# Patient Record
Sex: FEMALE | Race: WHITE | Employment: FULL TIME | URBAN - METROPOLITAN AREA
[De-identification: names, ages, dates, MRNs, and addresses within clinical notes are randomized per-mention and may not be internally consistent; named-entity substitution may affect disease eponyms.]

---

## 2017-03-15 RX ORDER — METOPROLOL SUCCINATE 50 MG/1
TABLET, EXTENDED RELEASE ORAL
Qty: 30 TABLET | Refills: 0 | Status: SHIPPED | OUTPATIENT
Start: 2017-03-15 | End: 2017-04-12

## 2017-03-15 NOTE — TELEPHONE ENCOUNTER
Pt has been notified. She states she has not seen Endocrine yet.    Future Appointments  Date Time Provider Julian Espinoza   3/22/2017 4:00 PM [facility] NURSE MAVERICK EM[facility]man to Dr. Anastasiia Kang

## 2017-03-15 NOTE — TELEPHONE ENCOUNTER
Script sent, but I would like newer blood pressure, because it wasn't quite under control. Can she come in for an RN check? Also, did she ever see endocrine?

## 2017-03-22 ENCOUNTER — NURSE ONLY (OUTPATIENT)
Dept: FAMILY MEDICINE CLINIC | Facility: CLINIC | Age: 47
End: 2017-03-22

## 2017-03-22 VITALS — DIASTOLIC BLOOD PRESSURE: 80 MMHG | SYSTOLIC BLOOD PRESSURE: 124 MMHG

## 2017-03-22 DIAGNOSIS — Z02.9 ENCOUNTERS FOR ADMINISTRATIVE PURPOSE: Primary | ICD-10-CM

## 2017-03-22 NOTE — PROGRESS NOTES
Pt came in for a blood pressure check. I checked BP in right arm, and was not able to hear clearly. I had my coworker check BP, she checked in left arm.

## 2017-03-23 NOTE — PROGRESS NOTES
Patient notified and verbalized understanding. States she has appt with Endo 4/18/17.   Says she will need a refill of BP medication prior to appt.,  Advised patient to call our office when she has a few days left and we can refill

## 2017-04-12 RX ORDER — METOPROLOL SUCCINATE 50 MG/1
TABLET, EXTENDED RELEASE ORAL
Qty: 90 TABLET | Refills: 1 | Status: SHIPPED | OUTPATIENT
Start: 2017-04-12 | End: 2017-10-08

## 2017-04-20 ENCOUNTER — MED REC SCAN ONLY (OUTPATIENT)
Dept: FAMILY MEDICINE CLINIC | Facility: CLINIC | Age: 47
End: 2017-04-20

## 2017-05-25 ENCOUNTER — TELEPHONE (OUTPATIENT)
Dept: FAMILY MEDICINE CLINIC | Facility: CLINIC | Age: 47
End: 2017-05-25

## 2017-08-18 ENCOUNTER — OFFICE VISIT (OUTPATIENT)
Dept: FAMILY MEDICINE CLINIC | Facility: CLINIC | Age: 47
End: 2017-08-18

## 2017-08-18 VITALS
BODY MASS INDEX: 23 KG/M2 | SYSTOLIC BLOOD PRESSURE: 104 MMHG | WEIGHT: 118.63 LBS | RESPIRATION RATE: 16 BRPM | TEMPERATURE: 99 F | HEART RATE: 56 BPM | DIASTOLIC BLOOD PRESSURE: 70 MMHG

## 2017-08-18 DIAGNOSIS — B07.9 VIRAL WARTS, UNSPECIFIED TYPE: Primary | ICD-10-CM

## 2017-08-18 PROCEDURE — 99213 OFFICE O/P EST LOW 20 MIN: CPT | Performed by: FAMILY MEDICINE

## 2017-08-18 NOTE — PROGRESS NOTES
Kylee Castro is a 48year old female. CC:  Patient presents with:  Growth: on right hand per pt      HPI:  Growth on R hand for many years. OTC wart meds of no help. The lesion can get bumped and bleed.   Allergies:    Bee Venom               Unk encounter      No Follow-up on file.                 Authorized by Oksana Bowers M.D.

## 2017-10-09 RX ORDER — METOPROLOL SUCCINATE 50 MG/1
TABLET, EXTENDED RELEASE ORAL
Qty: 90 TABLET | Refills: 1 | Status: SHIPPED | OUTPATIENT
Start: 2017-10-09 | End: 2018-04-08

## 2017-10-09 NOTE — TELEPHONE ENCOUNTER
LOV 08/18/2017 with TJ for viral warts. Last refill  04/12/2017  #90 w. 1RF  No future appointments. Please advise.

## 2017-10-09 NOTE — TELEPHONE ENCOUNTER
Patient advised, states she had b/p checked 8/18 = 104/70. Also states she has lost \"a bunch of weight\". Do you still need to see her, states she does not get physicals.

## 2017-10-11 ENCOUNTER — PATIENT OUTREACH (OUTPATIENT)
Dept: FAMILY MEDICINE CLINIC | Facility: CLINIC | Age: 47
End: 2017-10-11

## 2017-10-11 NOTE — PROGRESS NOTES
Raul Lomeli is due for mammogram.   Last mammogram date:  None in Epic. Mychart/Letter sent to patient.

## 2018-01-08 ENCOUNTER — OFFICE VISIT (OUTPATIENT)
Dept: FAMILY MEDICINE CLINIC | Facility: CLINIC | Age: 48
End: 2018-01-08

## 2018-01-08 VITALS
HEART RATE: 58 BPM | TEMPERATURE: 99 F | SYSTOLIC BLOOD PRESSURE: 140 MMHG | WEIGHT: 128.19 LBS | BODY MASS INDEX: 24.52 KG/M2 | DIASTOLIC BLOOD PRESSURE: 82 MMHG | RESPIRATION RATE: 12 BRPM | HEIGHT: 60.5 IN

## 2018-01-08 DIAGNOSIS — I10 ESSENTIAL HYPERTENSION: Primary | ICD-10-CM

## 2018-01-08 PROCEDURE — 99213 OFFICE O/P EST LOW 20 MIN: CPT | Performed by: FAMILY MEDICINE

## 2018-01-08 NOTE — PROGRESS NOTES
Stephen Schneider is a 50year old female. HPI:   Patient presents for recheck of her hypertension. Pt has been taking medications as instructed, no medication side effects, home BP monitoring in the range of 248'R systolic and 90'B diastolic.    When denies headaches    EXAM:   /82   Pulse 58   Temp 99 °F (37.2 °C) (Temporal)   Resp 12   Ht 60.5\"   Wt 128 lb 3.2 oz   BMI 24.63 kg/m²   GENERAL: well developed, well nourished,in no apparent distress  SKIN: no rashes,no suspicious lesions  HEENT: a

## 2018-04-09 RX ORDER — METOPROLOL SUCCINATE 50 MG/1
TABLET, EXTENDED RELEASE ORAL
Qty: 90 TABLET | Refills: 1 | Status: SHIPPED | OUTPATIENT
Start: 2018-04-09 | End: 2018-10-07

## 2018-10-08 RX ORDER — METOPROLOL SUCCINATE 50 MG/1
TABLET, EXTENDED RELEASE ORAL
Qty: 90 TABLET | Refills: 0 | Status: SHIPPED | OUTPATIENT
Start: 2018-10-08 | End: 2019-01-07

## 2018-11-13 ENCOUNTER — PATIENT OUTREACH (OUTPATIENT)
Dept: FAMILY MEDICINE CLINIC | Facility: CLINIC | Age: 48
End: 2018-11-13

## 2019-01-07 RX ORDER — METOPROLOL SUCCINATE 50 MG/1
TABLET, EXTENDED RELEASE ORAL
Qty: 90 TABLET | Refills: 0 | Status: SHIPPED | OUTPATIENT
Start: 2019-01-07 | End: 2019-04-08

## 2019-04-08 RX ORDER — METOPROLOL SUCCINATE 50 MG/1
TABLET, EXTENDED RELEASE ORAL
Qty: 90 TABLET | Refills: 0 | Status: SHIPPED | OUTPATIENT
Start: 2019-04-08 | End: 2019-07-08

## 2019-04-08 NOTE — TELEPHONE ENCOUNTER
Last refilled on 1/7/19 for # 90 with 0 refills  Last OV 1/8/18, BP 1/8/18  No future appointments. Thank you.

## 2019-05-31 ENCOUNTER — OFFICE VISIT (OUTPATIENT)
Dept: FAMILY MEDICINE CLINIC | Facility: CLINIC | Age: 49
End: 2019-05-31
Payer: COMMERCIAL

## 2019-05-31 VITALS
SYSTOLIC BLOOD PRESSURE: 130 MMHG | DIASTOLIC BLOOD PRESSURE: 80 MMHG | WEIGHT: 155.19 LBS | RESPIRATION RATE: 14 BRPM | OXYGEN SATURATION: 99 % | BODY MASS INDEX: 28.92 KG/M2 | HEIGHT: 61.5 IN | TEMPERATURE: 98 F | HEART RATE: 62 BPM

## 2019-05-31 DIAGNOSIS — M54.2 NECK PAIN: ICD-10-CM

## 2019-05-31 DIAGNOSIS — G44.229 CHRONIC TENSION-TYPE HEADACHE, NOT INTRACTABLE: Primary | ICD-10-CM

## 2019-05-31 DIAGNOSIS — M54.12 CERVICAL RADICULOPATHY: ICD-10-CM

## 2019-05-31 PROCEDURE — 99214 OFFICE O/P EST MOD 30 MIN: CPT | Performed by: FAMILY MEDICINE

## 2019-05-31 RX ORDER — CYCLOBENZAPRINE HCL 10 MG
10 TABLET ORAL 3 TIMES DAILY PRN
Qty: 15 TABLET | Refills: 0 | Status: SHIPPED | OUTPATIENT
Start: 2019-05-31 | End: 2019-06-20

## 2019-05-31 RX ORDER — METOPROLOL SUCCINATE 50 MG/1
TABLET, EXTENDED RELEASE ORAL
COMMUNITY
Start: 2018-07-08 | End: 2019-06-25 | Stop reason: ALTCHOICE

## 2019-05-31 NOTE — PROGRESS NOTES
Lida Louise is a 46year old female. Patient presents with:  Headache: per pt  Neck Pain      HPI:   Neck pain and bad headaches over the past month. Once weekly. Headache will last the whole day and be incapacitating.  excedrine migraine helps some exertion  CARDIOVASCULAR: denies chest pain on exertion  GI: denies abdominal pain and denies heartburn  NEURO: denies headaches    EXAM:   /80   Pulse 62   Temp 98.3 °F (36.8 °C) (Temporal)   Resp 14   Ht 61.5\"   Wt 155 lb 3.2 oz   SpO2 99%   BMI 2

## 2019-06-25 ENCOUNTER — OFFICE VISIT (OUTPATIENT)
Dept: FAMILY MEDICINE CLINIC | Facility: CLINIC | Age: 49
End: 2019-06-25
Payer: COMMERCIAL

## 2019-06-25 VITALS
BODY MASS INDEX: 29.08 KG/M2 | TEMPERATURE: 98 F | OXYGEN SATURATION: 98 % | HEART RATE: 65 BPM | HEIGHT: 61.5 IN | RESPIRATION RATE: 14 BRPM | SYSTOLIC BLOOD PRESSURE: 120 MMHG | WEIGHT: 156 LBS | DIASTOLIC BLOOD PRESSURE: 84 MMHG

## 2019-06-25 DIAGNOSIS — Z12.39 SCREENING FOR BREAST CANCER: ICD-10-CM

## 2019-06-25 DIAGNOSIS — Z00.00 HEALTHY ADULT ON ROUTINE PHYSICAL EXAMINATION: Primary | ICD-10-CM

## 2019-06-25 PROCEDURE — 99396 PREV VISIT EST AGE 40-64: CPT | Performed by: FAMILY MEDICINE

## 2019-06-25 NOTE — PROGRESS NOTES
HPI:   Jus Julien is a 46year old female who presents for a complete physical exam. Symptoms: denies discharge, itching, burning or dysuria, is menopausal. Was on depo until last fall, has not started bleeding since then.  Patient complains of noth • Hypertension Daughter       Social History:   Social History    Tobacco Use      Smoking status: Current Every Day Smoker        Packs/day: 0.75        Years: 30.00        Pack years: 22.5      Smokeless tobacco: Never Used    Alcohol use: No      [facility] mammogram  . Self breast exam explained. Health maintenance, will check fasting Lipids, CMP, and CBC. Pt refused screening colonoscopy, she was sent home with occult blood cards.  Pt info handouts given for: exercise, low fat diet and breast self-exam. Pt'

## 2019-07-08 RX ORDER — METOPROLOL SUCCINATE 50 MG/1
TABLET, EXTENDED RELEASE ORAL
Qty: 90 TABLET | Refills: 0 | Status: SHIPPED | OUTPATIENT
Start: 2019-07-08 | End: 2019-10-04

## 2019-07-29 ENCOUNTER — TELEPHONE (OUTPATIENT)
Dept: FAMILY MEDICINE CLINIC | Facility: CLINIC | Age: 49
End: 2019-07-29

## 2019-08-30 ENCOUNTER — MED REC SCAN ONLY (OUTPATIENT)
Dept: FAMILY MEDICINE CLINIC | Facility: CLINIC | Age: 49
End: 2019-08-30

## 2019-10-04 RX ORDER — METOPROLOL SUCCINATE 50 MG/1
TABLET, EXTENDED RELEASE ORAL
Qty: 90 TABLET | Refills: 2 | Status: SHIPPED | OUTPATIENT
Start: 2019-10-04 | End: 2020-06-29

## 2020-06-29 RX ORDER — METOPROLOL SUCCINATE 50 MG/1
TABLET, EXTENDED RELEASE ORAL
Qty: 90 TABLET | Refills: 0 | Status: SHIPPED | OUTPATIENT
Start: 2020-06-29 | End: 2020-09-28

## 2020-06-30 NOTE — TELEPHONE ENCOUNTER
She is due for her yearly physical again. I will send in 1 90 day supply, but no refills after that. Please schedule.

## 2020-06-30 NOTE — TELEPHONE ENCOUNTER
Patient notified and verbalized understanding. States she has recently been in hospital for perforated colon and abscess. (records from [facility] in care everywhere)  Is going to need colon resection.   Has upcoming kermit with GI  Is feeling much after infection

## 2020-07-01 NOTE — TELEPHONE ENCOUNTER
Jean Hernandez, I had no idea she had been that sick. Glad she is feeling better. I guess we'll see her if she needs pre-op clearance. Please advise to reach out if she needs anything. Thanks for the update.

## 2020-07-31 ENCOUNTER — MED REC SCAN ONLY (OUTPATIENT)
Dept: FAMILY MEDICINE CLINIC | Facility: CLINIC | Age: 50
End: 2020-07-31

## 2020-08-26 ENCOUNTER — MED REC SCAN ONLY (OUTPATIENT)
Dept: FAMILY MEDICINE CLINIC | Facility: CLINIC | Age: 50
End: 2020-08-26

## 2020-09-28 RX ORDER — METOPROLOL SUCCINATE 50 MG/1
TABLET, EXTENDED RELEASE ORAL
Qty: 90 TABLET | Refills: 0 | Status: SHIPPED | OUTPATIENT
Start: 2020-09-28 | End: 2021-01-15

## 2020-09-28 NOTE — TELEPHONE ENCOUNTER
Pt failed refill protocol for the following reasons:    Hypertension Medications Protocol [id number]/27/2020 09:17 AM   CMP or BMP in past 12 months Protocol Details    Appointment in past 6 or next 3 months            Last refill: 6- #90 with 0 refil

## 2020-09-29 PROCEDURE — 93010 ELECTROCARDIOGRAM REPORT: CPT | Performed by: INTERNAL MEDICINE

## 2020-09-30 ENCOUNTER — TELEPHONE (OUTPATIENT)
Dept: FAMILY MEDICINE CLINIC | Facility: CLINIC | Age: 50
End: 2020-09-30

## 2020-09-30 NOTE — TELEPHONE ENCOUNTER
Pt called, States we called her today, pt sent message via my chart regarding her er visit on 09/29/20.   Please call pt at [phone]

## 2020-10-12 ENCOUNTER — MED REC SCAN ONLY (OUTPATIENT)
Dept: FAMILY MEDICINE CLINIC | Facility: CLINIC | Age: 50
End: 2020-10-12

## 2020-10-12 NOTE — TELEPHONE ENCOUNTER
She had surgery last month and will follow up when needed. Was also in ER for palpitations. Had afib during surgery. Call one more time, to make sure she has follow up with cardiology if needed.

## 2020-10-12 NOTE — TELEPHONE ENCOUNTER
Have attempted to contact patient with no response. Please advise if any further action needed.     Do not see any mychart message from patient

## 2020-10-12 NOTE — TELEPHONE ENCOUNTER
Patient notified via detailed voicemail left at cell number (ok per  HIPAA consent). Advised RN was calling f/u on her call to our office and wanted to make sure there was nothing she needs from us. Advised to schedule with cardiology if needed.

## 2020-11-04 ENCOUNTER — MED REC SCAN ONLY (OUTPATIENT)
Dept: FAMILY MEDICINE CLINIC | Facility: CLINIC | Age: 50
End: 2020-11-04

## 2020-12-10 ENCOUNTER — OFFICE VISIT (OUTPATIENT)
Dept: FAMILY MEDICINE CLINIC | Facility: CLINIC | Age: 50
End: 2020-12-10
Payer: COMMERCIAL

## 2020-12-10 VITALS
OXYGEN SATURATION: 97 % | DIASTOLIC BLOOD PRESSURE: 92 MMHG | HEART RATE: 87 BPM | BODY MASS INDEX: 26.81 KG/M2 | HEIGHT: 61 IN | SYSTOLIC BLOOD PRESSURE: 142 MMHG | RESPIRATION RATE: 16 BRPM | TEMPERATURE: 99 F | WEIGHT: 142 LBS

## 2020-12-10 DIAGNOSIS — S37.10XS URETER INJURY, SEQUELA: ICD-10-CM

## 2020-12-10 DIAGNOSIS — I48.91 NEW ONSET A-FIB (HCC): ICD-10-CM

## 2020-12-10 DIAGNOSIS — Z90.49 HISTORY OF RESECTION OF LARGE BOWEL: ICD-10-CM

## 2020-12-10 DIAGNOSIS — R42 EPISODIC LIGHTHEADEDNESS: Primary | ICD-10-CM

## 2020-12-10 PROBLEM — K57.92 DIVERTICULITIS OF INTESTINE WITHOUT PERFORATION OR ABSCESS WITHOUT BLEEDING: Status: ACTIVE | Noted: 2020-06-15

## 2020-12-10 PROCEDURE — 99214 OFFICE O/P EST MOD 30 MIN: CPT | Performed by: FAMILY MEDICINE

## 2020-12-10 PROCEDURE — 3080F DIAST BP >= 90 MM HG: CPT | Performed by: FAMILY MEDICINE

## 2020-12-10 PROCEDURE — 3077F SYST BP >= 140 MM HG: CPT | Performed by: FAMILY MEDICINE

## 2020-12-10 PROCEDURE — 3008F BODY MASS INDEX DOCD: CPT | Performed by: FAMILY MEDICINE

## 2020-12-10 NOTE — PROGRESS NOTES
Raul Lomeli is a 44year old female. Patient presents with:  Post-Op: issues after surgery, a-fib      HPI:   Had surgery 9/21: colectomy with hernia repair. Surgery was recommended after diverticulitis in June.  That healed, then she went in fo 22.5      Smokeless tobacco: Never Used    Alcohol use: No      Alcohol/week: 0.0 standard drinks    Drug use: No       BP Readings from Last 6 Encounters:  12/10/20 : (!) 142/92  06/25/19 : 120/84  05/31/19 : 130/80  01/08/18 : 140/82  08/18/17 : 104/70 FREE T4; Future  -     VITAMIN B12; Future  -     FOLIC ACID SERUM(FOLATE); Future  -     IRON AND TIBC; Future  -     FERRITIN; Future  - check labs     History of resection of large bowel  -     CBC WITH DIFFERENTIAL WITH PLATELET;  Future  -     COMP MET of Occurrences: 1          Standing Expiration Date: 12/10/2021          Order Specific Question: Release to patient          Answer: Immediate      Ferritin          Standing Status: Future          Number of Occurrences: 1          Standing Expiration Da

## 2021-01-15 ENCOUNTER — MED REC SCAN ONLY (OUTPATIENT)
Dept: FAMILY MEDICINE CLINIC | Facility: CLINIC | Age: 51
End: 2021-01-15

## 2021-01-15 RX ORDER — METOPROLOL SUCCINATE 50 MG/1
TABLET, EXTENDED RELEASE ORAL
Qty: 90 TABLET | Refills: 0 | Status: SHIPPED | OUTPATIENT
Start: 2021-01-15 | End: 2021-04-09

## 2021-01-15 NOTE — TELEPHONE ENCOUNTER
Pt failed refill protocol for the following reasons:    Hypertension Medications Protocol Vpiomm36/15/2021 12:11 AM   CMP or BMP in past 12 months           Last refill: 9- #90 with 0 refills  Last appt: 12-  Next appt: No future appointments

## 2021-03-31 ENCOUNTER — TELEPHONE (OUTPATIENT)
Dept: FAMILY MEDICINE CLINIC | Facility: CLINIC | Age: 51
End: 2021-03-31

## 2021-04-09 RX ORDER — METOPROLOL SUCCINATE 50 MG/1
TABLET, EXTENDED RELEASE ORAL
Qty: 90 TABLET | Refills: 0 | Status: SHIPPED | OUTPATIENT
Start: 2021-04-09 | End: 2021-07-06

## 2021-04-09 NOTE — TELEPHONE ENCOUNTER
Last oV 12/10/2020  Last lab 12/14/2020 (see 1/21/21 scan) BMP/Creat 0.72  Last refilled 1/15/21  #90  0 refills  Refilled x 3 months per protocol

## 2021-04-30 ENCOUNTER — MED REC SCAN ONLY (OUTPATIENT)
Dept: FAMILY MEDICINE CLINIC | Facility: CLINIC | Age: 51
End: 2021-04-30

## 2021-05-14 ENCOUNTER — MED REC SCAN ONLY (OUTPATIENT)
Dept: FAMILY MEDICINE CLINIC | Facility: CLINIC | Age: 51
End: 2021-05-14

## 2021-07-06 RX ORDER — METOPROLOL SUCCINATE 50 MG/1
50 TABLET, EXTENDED RELEASE ORAL DAILY
Qty: 90 TABLET | Refills: 2 | Status: SHIPPED | OUTPATIENT
Start: 2021-07-06 | End: 2022-02-05

## 2021-07-06 NOTE — TELEPHONE ENCOUNTER
Hypertension Medications Protocol [id number]/04/2021 08:06 AM   CMP or BMP in past 12 months Protocol Details    Appointment in past 6 or next 3 months     Last serum creatinine< 2.0      Last OV 12/10/2020  Last lab 12/14/2020  BMP/Creat 0.72  Last refilled 4/9/21  #90  0 refills   No future appointments.

## 2022-02-05 RX ORDER — METOPROLOL SUCCINATE 50 MG/1
TABLET, EXTENDED RELEASE ORAL
Qty: 90 TABLET | Refills: 2 | Status: SHIPPED | OUTPATIENT
Start: 2022-02-05 | End: 2022-02-15

## 2022-02-05 NOTE — TELEPHONE ENCOUNTER
Pt failed refill protocol for the following reasons:     Hypertension Medications Protocol Failed 02/05/2022 11:31 AM   Protocol Details  CMP or BMP in past 12 months    Appointment in past 6 or next 3 months            Last refill: 7/06/2021 #90 with 2 refills  Last appt: 12/10/2020  Next appt: No future appointments. Forward to Dr. Kayleigh Messina, please advise on refills. Thank you.

## 2022-02-15 ENCOUNTER — TELEPHONE (OUTPATIENT)
Dept: FAMILY MEDICINE CLINIC | Facility: CLINIC | Age: 52
End: 2022-02-15

## 2022-02-15 RX ORDER — METOPROLOL SUCCINATE 50 MG/1
50 TABLET, EXTENDED RELEASE ORAL DAILY
Qty: 90 TABLET | Refills: 0 | Status: SHIPPED | OUTPATIENT
Start: 2022-02-15

## 2022-02-15 NOTE — TELEPHONE ENCOUNTER
[facility]. Leaving job at the end of the week so she needs it sent out today.     After this refill, she will have the rest of her prescriptions [facility]

## 2022-02-15 NOTE — TELEPHONE ENCOUNTER
Faxed refill request received from [facility] [employer] for patient metoprolol    Has not used mail order before.   Last refilled 2/5/22 to [facility]      Left message for patient to call office and confirm if she wants script sent to [facility] mail order or is using [facility]

## 2022-04-23 RX ORDER — METOPROLOL SUCCINATE 50 MG/1
50 TABLET, EXTENDED RELEASE ORAL DAILY
Qty: 90 TABLET | Refills: 0 | Status: SHIPPED | OUTPATIENT
Start: 2022-04-23

## 2022-04-23 NOTE — TELEPHONE ENCOUNTER
Last refilled 2/15/22 for #90 with 0 RF  LOV with KE 12/10/20  No future appt with pcp  Last CMP 1/21/21 scanned in    Hypertension Medications Protocol Failed 04/23/2022 04:24 AM   Protocol Details  CMP or BMP in past 12 months    Appointment in past 6 or next 3 months    Last serum creatinine< 2.0

## 2022-04-23 NOTE — TELEPHONE ENCOUNTER
LVM for pt advising 90 day was sent until pt can come in office to see KE. Office number provided to call and schedule.

## 2022-04-23 NOTE — TELEPHONE ENCOUNTER
She needs to be seen in office before I can give a year supply. I can send a 90 day supply to give her time to get scheduled.

## 2022-05-16 ENCOUNTER — PATIENT MESSAGE (OUTPATIENT)
Dept: FAMILY MEDICINE CLINIC | Facility: CLINIC | Age: 52
End: 2022-05-16

## 2022-05-16 RX ORDER — METOPROLOL SUCCINATE 50 MG/1
50 TABLET, EXTENDED RELEASE ORAL DAILY
Qty: 90 TABLET | Refills: 0 | Status: SHIPPED | OUTPATIENT
Start: 2022-05-16

## 2022-05-16 RX ORDER — METOPROLOL SUCCINATE 50 MG/1
50 TABLET, EXTENDED RELEASE ORAL DAILY
Qty: 90 TABLET | Refills: 0 | Status: CANCELLED | OUTPATIENT
Start: 2022-05-16

## 2022-05-16 NOTE — TELEPHONE ENCOUNTER
From: Denson Severin  To: Amie Veloz DO  Sent: 5/16/2022 7:08 AM CDT  Subject: Prescription    Good morning. I no longer have any medical insurance. [facility] is not going to fill the prescription. Can you please have the refill sent to [facility] in [place]? I will have to pay out of pocket but only have about a week of medication left so I will need the refill by this weekend. I should have insurance with my new job shortly so when I do I will schedule an appointment to come in to the office.

## 2022-09-13 ENCOUNTER — TELEPHONE (OUTPATIENT)
Dept: FAMILY MEDICINE CLINIC | Facility: CLINIC | Age: 52
End: 2022-09-13

## 2022-09-13 NOTE — TELEPHONE ENCOUNTER
[id number] [facility]. REQUESTING MOST RECENT:     COLORECTAL SCREENING, PAP,MAMMO, DEXA, AND LABS.     SENT TO MEDICAL RECORDS

## 2022-09-15 ENCOUNTER — TELEPHONE (OUTPATIENT)
Dept: FAMILY MEDICINE CLINIC | Facility: CLINIC | Age: 52
End: 2022-09-15

## 2022-09-15 NOTE — TELEPHONE ENCOUNTER
Patient notified via detailed voicemail left at cell number (ok per  HIPAA consent)  Asked to call back to confirm she is transferring care

## 2022-09-15 NOTE — TELEPHONE ENCOUNTER
Records request received from [facility]. Need to contact patient to confirm if she is transferring care.

## 2022-11-14 RX ORDER — METOPROLOL SUCCINATE 50 MG/1
TABLET, EXTENDED RELEASE ORAL
Qty: 90 TABLET | Refills: 0 | OUTPATIENT
Start: 2022-11-14

## 2022-11-14 NOTE — TELEPHONE ENCOUNTER
Per 9/15/22 tel enc patient has moved and has new PCP, Dr Eduardo Sherwood and [facility] in [facility]. Refill denied.

## (undated) NOTE — LETTER
10/11/17      [address]        Dear Veronica Abdul      To help us provide the highest quality medical care, [facility] uses a sophisticated computer system to track our patient records.  Vince

## (undated) NOTE — LETTER
07/29/19        Jus Julien  [facility]      Dear Maida Henderson,    [facility] records indicate that you have outstanding lab work and or testing that was ordered for you and has not yet been completed:  Lab Frequency Next Occurrence   JALEN